# Patient Record
Sex: MALE | Race: WHITE | Employment: FULL TIME | URBAN - METROPOLITAN AREA
[De-identification: names, ages, dates, MRNs, and addresses within clinical notes are randomized per-mention and may not be internally consistent; named-entity substitution may affect disease eponyms.]

---

## 2021-03-16 PROBLEM — Z11.9 ENCOUNTER FOR SCREENING EXAMINATION FOR INFECTIOUS DISEASE: Status: ACTIVE | Noted: 2021-03-16

## 2021-03-16 PROBLEM — Z13.228 SCREENING FOR METABOLIC DISORDER: Status: ACTIVE | Noted: 2021-03-16

## 2021-03-16 PROBLEM — Z23 NEED FOR INFLUENZA VACCINATION: Status: ACTIVE | Noted: 2021-03-16

## 2021-03-16 PROBLEM — Z00.00 ANNUAL PHYSICAL EXAM: Status: ACTIVE | Noted: 2021-03-16

## 2021-03-16 PROBLEM — Z13.1 SCREENING FOR DIABETES MELLITUS: Status: ACTIVE | Noted: 2021-03-16

## 2021-03-16 PROBLEM — Z13.220 SCREENING CHOLESTEROL LEVEL: Status: ACTIVE | Noted: 2021-03-16

## 2021-03-16 PROBLEM — Z00.00 PREVENTATIVE HEALTH CARE: Status: ACTIVE | Noted: 2021-03-16

## 2021-03-25 PROBLEM — Z23 NEED FOR DIPHTHERIA-TETANUS-PERTUSSIS (TDAP) VACCINE: Status: ACTIVE | Noted: 2021-03-16

## 2021-03-25 PROBLEM — K21.9 GASTROESOPHAGEAL REFLUX DISEASE, UNSPECIFIED WHETHER ESOPHAGITIS PRESENT: Status: ACTIVE | Noted: 2021-03-25

## 2021-03-25 PROBLEM — R12 HEARTBURN: Status: ACTIVE | Noted: 2021-03-25

## 2024-04-12 ENCOUNTER — HOSPITAL ENCOUNTER (EMERGENCY)
Age: 39
Discharge: HOME | End: 2024-04-12
Payer: COMMERCIAL

## 2024-04-12 VITALS
TEMPERATURE: 97 F | HEIGHT: 75 IN | WEIGHT: 200 LBS | DIASTOLIC BLOOD PRESSURE: 72 MMHG | RESPIRATION RATE: 16 BRPM | SYSTOLIC BLOOD PRESSURE: 111 MMHG | HEART RATE: 60 BPM | OXYGEN SATURATION: 99 % | BODY MASS INDEX: 24.87 KG/M2

## 2024-04-12 DIAGNOSIS — S81.811A LACERATION OF RIGHT LOWER EXTREMITY, INITIAL ENCOUNTER: Primary | ICD-10-CM

## 2024-04-12 PROCEDURE — 12002 RPR S/N/AX/GEN/TRNK2.6-7.5CM: CPT

## 2024-04-12 PROCEDURE — 99283 EMERGENCY DEPT VISIT LOW MDM: CPT

## 2024-04-12 NOTE — ED PROVIDER NOTES
Chief Complaint   Patient presents with    Laceration/Abrasion       HPI:     Brian Maldonado is a 39 year old male with no significant medical history who is up-to-date with tetanus presents to ER for right leg laceration sustained just prior to arrival.  Patient was using a chainsaw when it accidentally touched his right leg causing superficial laceration.  Bleeding controlled.  No other complaints.      PFSH    PFSH asessment screens reviewed  Nurses notes reviewed    Family History   Problem Relation Age of Onset    Cancer Mother     Heart Disorder Maternal Grandmother        Social History     Socioeconomic History    Marital status:      Spouse name: Not on file    Number of children: Not on file    Years of education: Not on file    Highest education level: Not on file   Occupational History    Not on file   Tobacco Use    Smoking status: Never    Smokeless tobacco: Current   Vaping Use    Vaping status: Never Used   Substance and Sexual Activity    Alcohol use: Yes    Drug use: Never    Sexual activity: Not on file   Other Topics Concern    Not on file   Social History Narrative    Not on file     Social Determinants of Health     Financial Resource Strain: Not on file   Food Insecurity: Not on file   Transportation Needs: Not on file   Physical Activity: Not on file   Stress: Not on file   Social Connections: Not on file   Housing Stability: Not on file         ROS:   Positive for stated complaint  All other systems reviewed and negative except as noted above.  Constitutional and Vital Signs Reviewed.      Physical Exam:     Findings:    /72   Pulse 60   Temp 97.3 °F (36.3 °C) (Temporal)   Resp 16   Ht 190.5 cm (6' 3\")   Wt 90.7 kg   SpO2 99%   BMI 25.00 kg/m²   GENERAL: alert, normal appearance, no distress.                 HEAD: Normocephalic, atraumatic.                    EARS: External ears normal.                  NOSE: Normal external appearance.                   MOUTH: Moist  mucous membranes                   EYES: Conjunctiva without injection, EOMs intact.             NECK: supple.             CARDIO: Regular rate and rhythm              LUNGS: No respiratory distress, nonlabored respirations.             MSK: Right lower extremity normal range of motion.  Anterior mid thigh 5 cm superficial gaping laceration without deep structure or muscle or fascia involvement.  Wound explored after irrigation and local anesthetic in bloodless field without evidence of foreign body.  NEURO: Alert and oriented.       MDM/Assessment/Plan:   Orders for this encounter:    Orders Placed This Encounter    Wound dressing    bacitracin 500 UNIT/GM ointment     Order Specific Question:   Please indicate site of application     Answer:   R lower ex     Laceration repair procedure:   Procedure, risks, benefits and alternatives are discussed.  Patient expresses understanding and verbal consent.  5cm laceration to right anterior thigh, superficial through the skin layer only.  No bleeding  Anesthesia: 7.5cc 1% lidocaine with epinephrine   Wound is irrigated with copious normal saline under pressure.  Area is draped in typical sterile fashion.  Wound is evaluated in bloodless field.  No tendon or bony involvement noted.  No foreign body appreciated.  7 sutures placed using 4-0 nylon suture.   Wound edges approximated.  hemostasis obtained.   NV intact s/p procedure.   Patient tolerated procedure well. No complications at this time.    Labs performed this visit:  No results found for this or any previous visit (from the past 10 hour(s)).    MDM:  39-year-old male with right lower extremity laceration only through the skin, up-to-date with tetanus, repaired here in the ER.  Proper wound care discussed in detail.  Follow-up with primary care Monday.  Present for medical care for any signs of infection.  Sutures out in approximately 10 to 14 days.  Patient agreeable to treatment plan and follow-up, all questions  answered prior to discharge.    Diagnosis:    ICD-10-CM    1. Laceration of right lower extremity, initial encounter  S81.811A           All results reviewed and discussed with patient.  See AVS for detailed discharge instructions for your condition today.    Follow Up with:  Tessa Guo MD  [id number]  [address]  [facility] [address]  [phone]    Follow up  Follow-up with primary care Monday for wound check and present to ER for new or worsening symptoms or signs of infection.  Sutures out in approximately 10 to 14 days.    John Vora MD  [address]  [facility] [address]  [phone]    Follow up  Follow-up with primary care Monday for wound check and present to ER for new or worsening symptoms or signs of infection. Sutures out in approximately 10 to 14 days.              NOTE TO PATIENT:  The 21st Century Cures Act makes medical notes like these available to patients in the interest of transparency. However, be advised this is a medical document. It is intended as peer to peer communication. It is written in medical language and may contain abbreviations or verbiage that are unfamiliar. It may appear blunt or direct. Medical documents are intended to carry relevant information, facts as evident, and the clinical opinion of the practitioner.